# Patient Record
(demographics unavailable — no encounter records)

---

## 2025-01-23 NOTE — HISTORY OF PRESENT ILLNESS
[Home] : at home, [unfilled] , at the time of the visit. [Medical Office: ([facility])___] : at the medical office located in  [Verbal consent obtained from patient] : the patient, [unfilled] [FreeTextEntry1] : Dasia is a 58-year-old female who presents for initial obesity medicine consultation. Of note she is a previous lap band patient of Dr. Dodson. Presents to discuss medications to assist with weight loss.  Patient had Lap-Band with Dr. Dodson in 2012 with a starting weight of 238 pounds. Her lowest weight was reportedly 172 and current weight 258 pounds which is 20 pounds higher than before her Lap-Band. Unfortunately patient has regained weight and states "her  will not let her remove her band and have a gastric bypass".  Patient expressed concern that she would like to possibly discuss revisional surgery however there seem to be barriers domestically.   was approved for Ozempic and presents for f/u visit.Patient was on 1 mg dose and new prescription recently placed for 2 mg.  Patient is down a total of 50 pounds since starting Ozempic and quite happy with her results.  Patient has a current BMI of 33.73.  She will continue Ozempic 2 mg.  I will see her in 3 months for follow-up.  Patient reports having recent blood work done with her PCP and will fax to my office for review and upload.

## 2025-01-23 NOTE — ASSESSMENT
[FreeTextEntry1] : In summary patient presents for obesity medicine follow-up.  She had previous lap band done with Dr. Dodson and was referred to myself for weight regain.  Was started on Ozempic secondary to prediabetes diagnoses and currently on 2 mg dose.  Doing well with no reported adverse side effects.  Encouraged to increase her physical activity to a 150 minutes/week minimum recommendation.  This exercise should include both cardiovascular and weight training methods.  Will continue to work on post bariatric surgery nutritional guidelines with a focus on lean high-quality proteins and a decrease in carbohydrates sugars and fats.  All questions and concerns answered at today's visit.

## 2025-01-23 NOTE — HISTORY OF PRESENT ILLNESS
[Home] : at home, [unfilled] , at the time of the visit. [Medical Office: ([facility])___] : at the medical office located in  [Verbal consent obtained from patient] : the patient, [unfilled] [FreeTextEntry1] : Dasia is a 58-year-old female who presents for initial obesity medicine consultation. Of note she is a previous lap band patient of Dr. Dodson. Presents to discuss medications to assist with weight loss.  Patient had Lap-Band with Dr. Dodson in 2012 with a starting weight of 238 pounds. Her lowest weight was reportedly 172 and current weight 258 pounds which is 20 pounds higher than before her Lap-Band. Unfortunately patient has regained weight and states "her  will not let her remove her band and have a gastric bypass".  Patient expressed concern that she would like to possibly discuss revisional surgery however there seem to be barriers domestically.   was approved for Ozempic and presents for f/u visit.Patient was on 1 mg dose and new prescription recently placed for 2 mg.  Patient is down a total of 50 pounds since starting Ozempic and quite happy with her results.  Patient has a current BMI of 33.73.  She will continue Ozempic 2 mg.  I will see her in 3 months for follow-up.  Patient reports having recent blood work done with her PCP and will fax to my office for review and upload.

## 2025-04-24 NOTE — HISTORY OF PRESENT ILLNESS
[Home] : at home, [unfilled] , at the time of the visit. [Medical Office: ([facility])___] : at the medical office located in  [Telehealth (audio & video)] : This visit was provided via telehealth using real-time 2-way audio visual technology. [Verbal consent obtained from patient] : the patient, [unfilled] [FreeTextEntry1] : Dasia is a 58-year-old female who presents for initial obesity medicine consultation. Of note she is a previous lap band patient of Dr. Dodson. Presents to discuss medications to assist with weight loss.  Patient had Lap-Band with Dr. Dodson in 2012 with a starting weight of 238 pounds. Her lowest weight was reportedly 172 and current weight 258 pounds which is 20 pounds higher than before her Lap-Band. Unfortunately patient has regained weight and states "her  will not let her remove her band and have a gastric bypass".  Patient expressed concern that she would like to possibly discuss revisional surgery however there seem to be barriers domestically.  was approved for Ozempic and presents for f/u visit. Patient was on 2.0 mg dose

## 2025-06-19 NOTE — HISTORY OF PRESENT ILLNESS
[Home] : at home, [unfilled] , at the time of the visit. [Medical Office: ([facility])___] : at the medical office located in  [Telehealth (audio & video)] : This visit was provided via telehealth using real-time 2-way audio visual technology. [Verbal consent obtained from patient] : the patient, [unfilled] [FreeTextEntry1] : Dasia is a 58-year-old female who presents for initial obesity medicine consultation. Of note she is a previous lap band patient of Dr. Dodson. Presents to discuss medications to assist with weight loss.  Patient had Lap-Band with Dr. Dodson in 2012 with a starting weight of 238 pounds. Her lowest weight was reportedly 172 and current weight 258 pounds which is 20 pounds higher than before her Lap-Band. Unfortunately, patient has regained weight and states "her  will not let her remove her band and have a gastric bypass".  Patient expressed concern that she would like to possibly discuss revisional surgery however there seem to be barriers domestically.  was approved for Ozempic and presents for f/u visit. on 2mg dose.